# Patient Record
Sex: FEMALE | Race: BLACK OR AFRICAN AMERICAN | NOT HISPANIC OR LATINO | URBAN - METROPOLITAN AREA
[De-identification: names, ages, dates, MRNs, and addresses within clinical notes are randomized per-mention and may not be internally consistent; named-entity substitution may affect disease eponyms.]

---

## 2021-08-19 ENCOUNTER — OFFICE VISIT (OUTPATIENT)
Dept: URBAN - METROPOLITAN AREA SURGERY CENTER 23 | Facility: SURGERY CENTER | Age: 56
End: 2021-08-19

## 2021-08-26 PROBLEM — 275978004 COLON CANCER SCREENING: Status: ACTIVE | Noted: 2021-08-26

## 2021-08-27 ENCOUNTER — OFFICE VISIT (OUTPATIENT)
Dept: URBAN - METROPOLITAN AREA SURGERY CENTER 23 | Facility: SURGERY CENTER | Age: 56
End: 2021-08-27

## 2021-09-21 ENCOUNTER — OFFICE VISIT (OUTPATIENT)
Dept: URBAN - METROPOLITAN AREA SURGERY CENTER 23 | Facility: SURGERY CENTER | Age: 56
End: 2021-09-21
Payer: COMMERCIAL

## 2021-09-21 DIAGNOSIS — Z12.11 AVERAGE RISK FOR CRC. DUE TO PT'S CO-MORBID STATE WITH END STAGE DEMENTIA, HIGH RISK FOR ANESTHESIA (PER NEUROLOGY); INABILITY TO TAKE A BOWEL PREP....WOULD NOT ADVISE ANY COLORECTAL CANCER SCREENING INCLUDING STOOL TEST FOR FECAL BLOOD.: ICD-10-CM

## 2021-09-21 PROCEDURE — G8907 PT DOC NO EVENTS ON DISCHARG: HCPCS | Performed by: INTERNAL MEDICINE

## 2021-09-21 PROCEDURE — 45378 DIAGNOSTIC COLONOSCOPY: CPT | Performed by: INTERNAL MEDICINE

## 2025-06-09 ENCOUNTER — DASHBOARD ENCOUNTERS (OUTPATIENT)
Age: 60
End: 2025-06-09

## 2025-06-09 ENCOUNTER — OFFICE VISIT (OUTPATIENT)
Dept: URBAN - METROPOLITAN AREA CLINIC 118 | Facility: CLINIC | Age: 60
End: 2025-06-09
Payer: COMMERCIAL

## 2025-06-09 DIAGNOSIS — R19.4 CHANGE IN BOWEL HABITS: ICD-10-CM

## 2025-06-09 PROCEDURE — 99204 OFFICE O/P NEW MOD 45 MIN: CPT | Performed by: INTERNAL MEDICINE

## 2025-06-09 RX ORDER — CANDESARTAN 16 MG/1
TAKE 2 TABLETS BY MOUTH EVERY DAY TABLET ORAL
Qty: 170 EACH | Refills: 0 | Status: ACTIVE | COMMUNITY

## 2025-06-09 RX ORDER — AMLODIPINE BESYLATE 5 MG/1
TAKE 1 TABLET BY MOUTH DAILY TABLET ORAL
Qty: 90 EACH | Refills: 0 | Status: ACTIVE | COMMUNITY

## 2025-06-09 RX ORDER — ATORVASTATIN CALCIUM 10 MG/1
TABLET ORAL
Qty: 90 TABLET | Status: ACTIVE | COMMUNITY

## 2025-06-09 RX ORDER — SERTRALINE 25 MG/1
TABLET, FILM COATED ORAL
Qty: 90 TABLET | Status: ACTIVE | COMMUNITY

## 2025-06-09 RX ORDER — METOPROLOL SUCCINATE 100 MG/1
TABLET, FILM COATED, EXTENDED RELEASE ORAL
Qty: 90 TABLET | Status: ACTIVE | COMMUNITY

## 2025-06-09 NOTE — HPI-TODAY'S VISIT:
Ms. Oneal is a 59 y/o female who presents today for evaluation of change in bowel habits.  Patient reports at the time of her last colonoscopy in 2021, she was having intermittent "thin" stools which would range between loose to formed. She underwent colonoscopy which was unremarkable, recommended to repeat in 10 years. Since then, bowel movements fluctuate between constipation and looser stools, though not true diarrhea. On average, she has loose, thin stools almost daily now, with occasional episodes of constipation a few times per month. She typically has a bowel movement after each meal but has recently noticed an increase to 4-5 bowel movements per day recently. She does wonder if her diet may be contributing (fried foods, sodas etc).  She denies any abdominal pain, blood in the stool, nausea, vomiting, or unintentional weight loss. There is no known family history of colorectal cancer. Recent medication changes include starting sertraline (1 year ago) and amlodipine (1 month ago). She has also taken meloxicam x1 dose last night for kinee pain, but otherwise denies regular NSAID use.

## 2025-06-09 NOTE — PHYSICAL EXAM HENT:
Head,  normocephalic,  atraumatic,  Face,  Face within normal limits,  Ears,  External ears within normal limits Additional Progress Note...

## 2025-06-12 ENCOUNTER — TELEPHONE ENCOUNTER (OUTPATIENT)
Dept: URBAN - METROPOLITAN AREA CLINIC 118 | Facility: CLINIC | Age: 60
End: 2025-06-12

## 2025-06-13 ENCOUNTER — TELEPHONE ENCOUNTER (OUTPATIENT)
Dept: URBAN - METROPOLITAN AREA CLINIC 118 | Facility: CLINIC | Age: 60
End: 2025-06-13

## 2025-06-16 ENCOUNTER — TELEPHONE ENCOUNTER (OUTPATIENT)
Dept: URBAN - METROPOLITAN AREA CLINIC 118 | Facility: CLINIC | Age: 60
End: 2025-06-16

## 2025-06-16 LAB
CALPROTECTIN, FECAL: 51
FECAL FAT, QUALITATIVE: NORMAL
RESULT:: (no result)